# Patient Record
Sex: MALE | Race: WHITE | Employment: FULL TIME | URBAN - METROPOLITAN AREA
[De-identification: names, ages, dates, MRNs, and addresses within clinical notes are randomized per-mention and may not be internally consistent; named-entity substitution may affect disease eponyms.]

---

## 2017-10-30 ENCOUNTER — OFFICE VISIT (OUTPATIENT)
Dept: FAMILY MEDICINE | Facility: CLINIC | Age: 57
End: 2017-10-30
Payer: COMMERCIAL

## 2017-10-30 VITALS
BODY MASS INDEX: 27.2 KG/M2 | OXYGEN SATURATION: 97 % | TEMPERATURE: 97.6 F | DIASTOLIC BLOOD PRESSURE: 86 MMHG | WEIGHT: 190 LBS | HEIGHT: 70 IN | HEART RATE: 83 BPM | SYSTOLIC BLOOD PRESSURE: 132 MMHG

## 2017-10-30 DIAGNOSIS — K59.00 CONSTIPATION, UNSPECIFIED CONSTIPATION TYPE: ICD-10-CM

## 2017-10-30 DIAGNOSIS — M54.50 ACUTE LEFT-SIDED LOW BACK PAIN WITHOUT SCIATICA: Primary | ICD-10-CM

## 2017-10-30 LAB
BASOPHILS # BLD AUTO: 0 10E9/L (ref 0–0.2)
BASOPHILS NFR BLD AUTO: 0.1 %
DIFFERENTIAL METHOD BLD: NORMAL
EOSINOPHIL # BLD AUTO: 0.1 10E9/L (ref 0–0.7)
EOSINOPHIL NFR BLD AUTO: 1.8 %
ERYTHROCYTE [DISTWIDTH] IN BLOOD BY AUTOMATED COUNT: 12.1 % (ref 10–15)
ERYTHROCYTE [SEDIMENTATION RATE] IN BLOOD BY WESTERGREN METHOD: 8 MM/H (ref 0–20)
HCT VFR BLD AUTO: 43.8 % (ref 40–53)
HGB BLD-MCNC: 15 G/DL (ref 13.3–17.7)
LYMPHOCYTES # BLD AUTO: 1.7 10E9/L (ref 0.8–5.3)
LYMPHOCYTES NFR BLD AUTO: 24.6 %
MCH RBC QN AUTO: 32.3 PG (ref 26.5–33)
MCHC RBC AUTO-ENTMCNC: 34.2 G/DL (ref 31.5–36.5)
MCV RBC AUTO: 94 FL (ref 78–100)
MONOCYTES # BLD AUTO: 0.8 10E9/L (ref 0–1.3)
MONOCYTES NFR BLD AUTO: 12.2 %
NEUTROPHILS # BLD AUTO: 4.2 10E9/L (ref 1.6–8.3)
NEUTROPHILS NFR BLD AUTO: 61.3 %
PLATELET # BLD AUTO: 206 10E9/L (ref 150–450)
RBC # BLD AUTO: 4.65 10E12/L (ref 4.4–5.9)
WBC # BLD AUTO: 6.8 10E9/L (ref 4–11)

## 2017-10-30 PROCEDURE — 99203 OFFICE O/P NEW LOW 30 MIN: CPT | Performed by: PHYSICIAN ASSISTANT

## 2017-10-30 PROCEDURE — 86140 C-REACTIVE PROTEIN: CPT | Performed by: PHYSICIAN ASSISTANT

## 2017-10-30 PROCEDURE — 85652 RBC SED RATE AUTOMATED: CPT | Performed by: PHYSICIAN ASSISTANT

## 2017-10-30 PROCEDURE — 80053 COMPREHEN METABOLIC PANEL: CPT | Performed by: PHYSICIAN ASSISTANT

## 2017-10-30 PROCEDURE — 36415 COLL VENOUS BLD VENIPUNCTURE: CPT | Performed by: PHYSICIAN ASSISTANT

## 2017-10-30 PROCEDURE — 82550 ASSAY OF CK (CPK): CPT | Performed by: PHYSICIAN ASSISTANT

## 2017-10-30 PROCEDURE — 85025 COMPLETE CBC W/AUTO DIFF WBC: CPT | Performed by: PHYSICIAN ASSISTANT

## 2017-10-30 RX ORDER — NAPROXEN 500 MG/1
500 TABLET ORAL 2 TIMES DAILY PRN
Qty: 40 TABLET | Refills: 0 | Status: SHIPPED | OUTPATIENT
Start: 2017-10-30 | End: 2019-02-22

## 2017-10-30 RX ORDER — KETOROLAC TROMETHAMINE 30 MG/ML
30 INJECTION, SOLUTION INTRAMUSCULAR; INTRAVENOUS ONCE
Qty: 1 ML | Refills: 0 | OUTPATIENT
Start: 2017-10-30 | End: 2017-10-30

## 2017-10-30 RX ORDER — CYCLOBENZAPRINE HCL 10 MG
10 TABLET ORAL
COMMUNITY
Start: 2017-10-30 | End: 2019-02-22

## 2017-10-30 RX ORDER — CAPSAICIN 0.025 %
CREAM (GRAM) TOPICAL
COMMUNITY
Start: 2017-10-30 | End: 2019-02-22

## 2017-10-30 RX ORDER — ORPHENADRINE CITRATE 100 MG/1
100 TABLET, EXTENDED RELEASE ORAL 2 TIMES DAILY PRN
Qty: 40 TABLET | Refills: 0 | Status: SHIPPED | OUTPATIENT
Start: 2017-10-30 | End: 2019-02-22

## 2017-10-30 RX ORDER — ASPIRIN 81 MG
100 TABLET, DELAYED RELEASE (ENTERIC COATED) ORAL 2 TIMES DAILY
Qty: 60 TABLET | Refills: 1 | COMMUNITY
Start: 2017-10-30 | End: 2019-02-22

## 2017-10-30 NOTE — MR AVS SNAPSHOT
"              After Visit Summary   10/30/2017    Evan Young    MRN: [id number]           Patient Information     Date Of Birth          1960        Visit Information        Provider Department      10/30/2017 3:00 PM Lina Linn PA-C [facility]        Today's Diagnoses     Acute left-sided low back pain without sciatica    -  1    Constipation, unspecified constipation type           Follow-ups after your visit        Who to contact     If you have questions or need follow up information about today's clinic visit or your schedule please contact [facility] directly at [phone].  Normal or non-critical lab and imaging results will be communicated to you by Rolithhart, letter or phone within 4 business days after the clinic has received the results. If you do not hear from us within 7 days, please contact the clinic through Backpackt or phone. If you have a critical or abnormal lab result, we will notify you by phone as soon as possible.  Submit refill requests through [employer] or call your pharmacy and they will forward the refill request to us. Please allow 3 business days for your refill to be completed.          Additional Information About Your Visit        MyChart Information     [employer] lets you send messages to your doctor, view your test results, renew your prescriptions, schedule appointments and more. To sign up, go to [email or web address] . Click on \"Log in\" on the left side of the screen, which will take you to the Welcome page. Then click on \"Sign up Now\" on the right side of the page.     You will be asked to enter the access code listed below, as well as some personal information. Please follow the directions to create your username and password.     Your access code is: [email or web address]  Expires: 2018  2:56 PM     Your access code will  in 90 days. If you need help or a new code, please call your [facility] or [phone].        Care " "EveryWhere ID     This is your Care EveryWhere ID. This could be used by other organizations to access your [facility] medical records  [id number]        Your Vitals Were     Pulse Temperature Height Pulse Oximetry BMI (Body Mass Index)       83 97.6  F (36.4  C) (Tympanic) 5' 10\" (1.778 m) 97% 27.26 kg/m2        Blood Pressure from Last 3 Encounters:   10/30/17 132/86    Weight from Last 3 Encounters:   10/30/17 190 lb (86.2 kg)              Today, you had the following     No orders found for display         Today's Medication Changes          These changes are accurate as of: 10/30/17  3:38 PM.  If you have any questions, ask your nurse or doctor.               Start taking these medicines.        Dose/Directions    capsaicin 0.025 % Crea cream   Commonly known as:  ZOSTRIX   Used for:  Acute left-sided low back pain without sciatica   Started by:  Lina Linn PA-C        Use as directed   Refills:  0       docusate sodium 100 MG tablet   Commonly known as:  COLACE   Used for:  Constipation, unspecified constipation type   Started by:  Lina Linn PA-C        Dose:  100 mg   Take 100 mg by mouth 2 times daily   Quantity:  60 tablet   Refills:  1       ketorolac 30 MG/ML injection   Commonly known as:  TORADOL   Used for:  Acute left-sided low back pain without sciatica   Started by:  Lina Linn PA-C        Dose:  30 mg   Inject 1 mL (30 mg) into the muscle once for 1 dose   Quantity:  1 mL   Refills:  0       naproxen 500 MG tablet   Commonly known as:  NAPROSYN   Used for:  Acute left-sided low back pain without sciatica   Started by:  Lina Linn PA-C        Dose:  500 mg   Take 1 tablet (500 mg) by mouth 2 times daily as needed for moderate pain   Quantity:  40 tablet   Refills:  0       orphenadrine 100 MG 12 hr tablet   Commonly known as:  NORFLEX   Used for:  Acute left-sided low back pain without sciatica   Started by:  Lina Linn PA-C        Dose:  100 mg "   Take 1 tablet (100 mg) by mouth 2 times daily as needed for muscle spasms or moderate to severe pain   Quantity:  40 tablet   Refills:  0            Where to get your medicines      These medications were sent to [facility] - [place], [place] - [address]  [address], [facility] [address]     Phone:  [phone]     naproxen 500 MG tablet    orphenadrine 100 MG 12 hr tablet         Some of these will need a paper prescription and others can be bought over the counter.  Ask your nurse if you have questions.     You don't need a prescription for these medications     capsaicin 0.025 % Crea cream    docusate sodium 100 MG tablet    ketorolac 30 MG/ML injection                Primary Care Provider    None Specified       No primary provider on file.        Equal Access to Services     RAMON TALAMANTES : Noreen Jackson, wamartha rosas, qaybta kaalmada mery, diony olmos. So [facility] [phone].    ATENCIÓN: Si habla español, tiene a shah disposición servicios gratuitos de asistencia lingüística. [facility] [phone].    We comply with applicable federal civil rights laws and [place] laws. We do not discriminate on the basis of race, color, national origin, age, disability, sex, sexual orientation, or gender identity.            Thank you!     Thank you for choosing [facility]  for your care. Our goal is always to provide you with excellent care. Hearing back from our patients is one way we can continue to improve our services. Please take a few minutes to complete the written survey that you may receive in the mail after your visit with us. Thank you!             Your Updated Medication List - Protect others around you: Learn how to safely use, store and throw away your medicines at www.disposemymeds.org.          This list is accurate as of: 10/30/17  3:38 PM.  Always use your most recent med list.                    Brand Name Dispense Instructions for use Diagnosis    capsaicin 0.025 % Crea cream    ZOSTRIX     Use as directed    Acute left-sided low back pain without sciatica       cyclobenzaprine 10 MG tablet    FLEXERIL     Take 10 mg by mouth    Acute left-sided low back pain without sciatica       docusate sodium 100 MG tablet    COLACE    60 tablet    Take 100 mg by mouth 2 times daily    Constipation, unspecified constipation type       ketorolac 30 MG/ML injection    TORADOL    1 mL    Inject 1 mL (30 mg) into the muscle once for 1 dose    Acute left-sided low back pain without sciatica       naproxen 500 MG tablet    NAPROSYN    40 tablet    Take 1 tablet (500 mg) by mouth 2 times daily as needed for moderate pain    Acute left-sided low back pain without sciatica       orphenadrine 100 MG 12 hr tablet    NORFLEX    40 tablet    Take 1 tablet (100 mg) by mouth 2 times daily as needed for muscle spasms or moderate to severe pain    Acute left-sided low back pain without sciatica

## 2017-10-30 NOTE — NURSING NOTE
"Chief Complaint   Patient presents with     ER F/U     [facility] er f/u 10/29 for lower back pain        Initial /86  Pulse 83  Temp 97.6  F (36.4  C) (Tympanic)  Ht 5' 10\" (1.778 m)  Wt 190 lb (86.2 kg)  SpO2 97%  BMI 27.26 kg/m2 Estimated body mass index is 27.26 kg/(m^2) as calculated from the following:    Height as of this encounter: 5' 10\" (1.778 m).    Weight as of this encounter: 190 lb (86.2 kg).  Medication Reconciliation: complete   Dulce Hall, ONEIDA      "

## 2017-10-30 NOTE — PROGRESS NOTES
SUBJECTIVE:   Evan Young is a 57 year old male who presents to clinic today for the following health issues:    ED/UC Followup:    Facility: [facility] [place]  Date of visit: 10/30/17  Reason for visit: Back Pain   Current Status: Continued back pain with constipation     Patient seen in the early morning at [facility] on 10/30/17 for left flank pain. Per note from Svensen, the pain began yesterday morning with little to no relief from ibuprofen. The severity was rated as 6/10-8/10 at times with sharp pain during the most painful episodes. Patient stated he also had some nausea, but denied any vomiting or hematuria. On physical exam, tenderness was found over the left flank and lumbar area. He was given 2 percocet tablets while in the ER and prescribed 20 flexeril 10 mg tablets. A UA was completed and found to have no evidence of hematuria and was negative for nitrites and leukocyte esterase. He was then recommended to follow-up with his PCP for re-evaluation.     Per patient, Flexeril was last taken this morning around 7 AM and he has not taken any ibuprofen today. He states pain is now dull, but in same place as it had been previously this AM (in low back on the left side). He also states it will sometimes radiate along to the abdomen and feels like it is causing inflammation around his sciatic nerve. He denies any numbness or tingling in his extremities and cannot recall any recent trauma or overuse that may have precipitated this. The most comfortable positions for him are sitting up or standing and laying flat is the most uncomfortable. Patient notes he has had back pain in the past due to an injury, but that this feels different. He has been using heat on his back all day and thinks that may have helped the pain become more dull versus the sharp pain he was experiencing early this morning. Per patient, no imaging or other labs were drawn besides a UA and he has no history of  "kidney stones. Additionally, he notes that he has been constipated since yesterday and feels this may be contributing to his pain. His wife states he was drinking plenty of water throughout the day yesterday, but has not been drinking as much today.   States pain is       Problem list and histories reviewed & adjusted, as indicated.  Additional history: as documented    There is no problem list on file for this patient.    Past Surgical History:   Procedure Laterality Date     NO HISTORY OF SURGERY         Social History   Substance Use Topics     Smoking status: Never Smoker     Smokeless tobacco: Never Used     Alcohol use Not on file     No family history on file.      Current Outpatient Prescriptions   Medication Sig Dispense Refill     cyclobenzaprine (FLEXERIL) 10 MG tablet Take 10 mg by mouth       ketorolac (TORADOL) 30 MG/ML injection Inject 1 mL (30 mg) into the muscle once for 1 dose 1 mL 0     orphenadrine (NORFLEX) 100 MG 12 hr tablet Take 1 tablet (100 mg) by mouth 2 times daily as needed for muscle spasms or moderate to severe pain 40 tablet 0     naproxen (NAPROSYN) 500 MG tablet Take 1 tablet (500 mg) by mouth 2 times daily as needed for moderate pain 40 tablet 0     docusate sodium (COLACE) 100 MG tablet Take 100 mg by mouth 2 times daily 60 tablet 1     capsaicin (ZOSTRIX) 0.025 % CREA cream Use as directed       No Known Allergies      Reviewed and updated as needed this visit by clinical staff  Tobacco  Allergies  Meds  Med Hx  Soc Hx      Reviewed and updated as needed this visit by Provider  Med Hx         ROS:  Constitutional, HEENT, cardiovascular, pulmonary, GI, , musculoskeletal, neuro, skin, endocrine and psych systems are negative, except as otherwise noted.    OBJECTIVE:   /86  Pulse 83  Temp 97.6  F (36.4  C) (Tympanic)  Ht 5' 10\" (1.778 m)  Wt 190 lb (86.2 kg)  SpO2 97%  BMI 27.26 kg/m2  Body mass index is 27.26 kg/(m^2).  GENERAL: healthy, alert and no " distress  ABDOMEN: tenderness to palpation over left lower quadrant, mild tenderness over right lower quadrant; soft, otherwise nontender, no hepatosplenomegaly, no masses and bowel sounds normal  MS: no gross musculoskeletal defects noted, no edema; no SI joint pain; non-tenderness to sciatic notch bilaterally, perilumbar musculature non-tender bilaterally; sebaceous cyst over low-mid back left side of spinous process; pain with left rotation, left lateral bend and right lateral bend as well as with flexion and extension  PSYCH: mentation appears normal, affect normal/bright   Diagnostic Test Results:  Results for orders placed or performed in visit on 10/30/17   CBC with platelets and differential   Result Value Ref Range    WBC 6.8 4.0 - 11.0 10e9/L    RBC Count 4.65 4.4 - 5.9 10e12/L    Hemoglobin 15.0 13.3 - 17.7 g/dL    Hematocrit 43.8 40.0 - 53.0 %    MCV 94 78 - 100 fl    MCH 32.3 26.5 - 33.0 pg    MCHC 34.2 31.5 - 36.5 g/dL    RDW 12.1 10.0 - 15.0 %    Platelet Count 206 150 - 450 10e9/L    Diff Method Automated Method     % Neutrophils 61.3 %    % Lymphocytes 24.6 %    % Monocytes 12.2 %    % Eosinophils 1.8 %    % Basophils 0.1 %    Absolute Neutrophil 4.2 1.6 - 8.3 10e9/L    Absolute Lymphocytes 1.7 0.8 - 5.3 10e9/L    Absolute Monocytes 0.8 0.0 - 1.3 10e9/L    Absolute Eosinophils 0.1 0.0 - 0.7 10e9/L    Absolute Basophils 0.0 0.0 - 0.2 10e9/L   ESR: Erythrocyte sedimentation rate   Result Value Ref Range    Sed Rate 8 0 - 20 mm/h        UA - Urinalysis with Reflex Micro (10/30/2017 12:21 AM)  UA - Urinalysis with Reflex Micro (10/30/2017 12:21 AM)   Component Value Ref Range   COLOR  Yellow Yellow Color   CLARITY  Clear Clear Clarity   SPECIFIC GRAVITY,URINE  1.020 1.010, 1.015, 1.020, 1.025    PH,URINE  6.0 6.0, 7.0, 8.0, 5.5, 6.5, 7.5, 8.5    UROBILINOGEN,QUALITATIVE  Normal Normal EU/dl   PROTEIN, URINE Negative Negative mg/dL   GLUCOSE, URINE Negative Negative mg/dL   KETONES,URINE  Negative  Negative mg/dL   BILIRUBIN,URINE  Negative Negative    OCCULT BLOOD,URINE  Negative Negative    NITRITE  Negative Negative    LEUKOCYTE ESTERASE  Negative Negative          ASSESSMENT/PLAN:   Evan was seen today for er f/u.    Diagnoses and all orders for this visit:    Acute left-sided low back pain without sciatica; Constipation, unspecified constipation type  Patient given toradol injection while in clinic in management of his continued back pain. Additionally, patient's flexeril discontinued and changed to norflex as patient stated flexeril did very little in helping his pain. Also recommended patient take naproxen BID as well as use topical capsaicin cream to help with pain relief. Patient's back and abdominal pain also likely related to constipation. Recommended patient stay well hydrated, increase his fiber intake as well as start taking docusate. Given patient's LLQ pain and no recent colonoscopy, labs ordered to ensure patient does not have an underlying diverticulitis or other infection. Last CK on 4/24/12 was 572 and was repeated today. Patient states he is not currently taking Crestor in management of his hyperlipidemia and likely attributed to his previously high CK. Recommended patient begin light exercises as tolerated to help in improving his range of motion. Additionally, recommended patient have sebaceous cyst removed if it should start to cause him discomfort. Patient to follow-up if symptoms do not improve or worsen in the next few days. Will reassess need for imaging if patient's symptoms do not improve.   -     ketorolac (TORADOL) 30 MG/ML injection; Inject 1 mL (30 mg) into the muscle once for 1 dose  -     orphenadrine (NORFLEX) 100 MG 12 hr tablet; Take 1 tablet (100 mg) by mouth 2 times daily as needed for muscle spasms or moderate to severe pain  -     naproxen (NAPROSYN) 500 MG tablet; Take 1 tablet (500 mg) by mouth 2 times daily as needed for moderate pain  -     capsaicin (ZOSTRIX)  0.025 % CREA cream; Use as directed  -     CBC with platelets and differential  -     Comprehensive metabolic panel  -     CK total  -     ESR: Erythrocyte sedimentation rate  -     CRP, inflammation  -     docusate sodium (COLACE) 100 MG tablet; Take 100 mg by mouth 2 times daily    Lina Linn PA-C  [facility]

## 2017-10-31 LAB
ALBUMIN SERPL-MCNC: 4 G/DL (ref 3.4–5)
ALP SERPL-CCNC: 53 U/L (ref 40–150)
ALT SERPL W P-5'-P-CCNC: 61 U/L (ref 0–70)
ANION GAP SERPL CALCULATED.3IONS-SCNC: 11 MMOL/L (ref 3–14)
AST SERPL W P-5'-P-CCNC: 36 U/L (ref 0–45)
BILIRUB SERPL-MCNC: 0.6 MG/DL (ref 0.2–1.3)
BUN SERPL-MCNC: 11 MG/DL (ref 7–30)
CALCIUM SERPL-MCNC: 9.4 MG/DL (ref 8.5–10.1)
CHLORIDE SERPL-SCNC: 104 MMOL/L (ref 94–109)
CK SERPL-CCNC: 239 U/L (ref 30–300)
CO2 SERPL-SCNC: 24 MMOL/L (ref 20–32)
CREAT SERPL-MCNC: 1.06 MG/DL (ref 0.66–1.25)
CRP SERPL-MCNC: <2.9 MG/L (ref 0–8)
GFR SERPL CREATININE-BSD FRML MDRD: 72 ML/MIN/1.7M2
GLUCOSE SERPL-MCNC: 90 MG/DL (ref 70–99)
POTASSIUM SERPL-SCNC: 4.4 MMOL/L (ref 3.4–5.3)
PROT SERPL-MCNC: 7.6 G/DL (ref 6.8–8.8)
SODIUM SERPL-SCNC: 139 MMOL/L (ref 133–144)

## 2017-11-01 NOTE — PROGRESS NOTES
Evan  I have reviewed your recent labs. Here are the results:    -Liver and gallbladder tests are normal. (ALT,AST, Alk phos, bilirubin), kidney function is normal (Cr, GFR), Sodium is normal, Potassium is normal, Calcium is normal, Glucose is normal (diabetes screening test).   -Normal red blood cell (hgb) levels, normal white blood cell count and normal platelet levels.  -Inflammatory markers are normal (CRP, ESR)  -Muscle enzyme marker is normal (CK)    For additional lab test information, labtestsonline.org is an excellent reference.      If you have any questions please do not hesitate to contact our office via phone ([phone]) or MyChart.    Lina Linn, MS, PA-C  [facility]

## 2019-02-19 ENCOUNTER — TELEPHONE (OUTPATIENT)
Dept: FAMILY MEDICINE | Facility: CLINIC | Age: 59
End: 2019-02-19

## 2019-02-19 NOTE — TELEPHONE ENCOUNTER
"  Wife my charted information below       ____________________________________________________________________________________________    Megan,    His full name is Evan Young, and YOB: 1960.  Will you answer the question about the appointment on Friday in his account? or will you let me know?    Thank you so much,  Audra  ----- Message -----  From: Office of Letitia Peña MD  Sent: 2/18/2019  6:33 PM CST  To: Audra Brayden Young  Subject: RE: Question about an upcoming visit  What is his full name and date of birth ? My chart becomes a permeant part of your medical chart. I will have to transfer this information to his chart.     Thank you for your time     Megan  RN, BSN  [facility]        ----- Message -----     From: Audra Young     Sent: 2/18/2019  3:52 PM CST       To: Letitia Peña MD  Subject: Question about an upcoming visit    [facility]    I am writing about my , Evan Young,  who, although you have not yet seen, would like to have you as his doctor.  He has called the clinic and made an appointment for your first available time--April 4th at 11:30 am.  However, he is in pain and will need to see either you or someone in your team sooner.  He is currently traveling for work but will be back in town Thursday night. I am wondering if we can make an appointment for Friday.    He has what we believe is a lipoma in his back, 4 or 5\" above the lower back area. Last week, it became red and the area was warmer than the surrounding areas.  For about 2 days we applied Cortizone 1mg cream => no improvement.  Friday, we tried neosporin, it improved a little at first. But the area became very sore.  We applied warm compresses as well.     Unfortunately,  he had to travel for work from today returning Thursday night.  He is under a lot of pain and has asked me to try to set up an appointment sooner than the scheduled for April " 4th.    Would you be so kind as to let us know the best way to proceed?    Thank you so much,  Audra  __________________________________________________________________________________________________

## 2019-02-19 NOTE — TELEPHONE ENCOUNTER
Called #   Telephone Information:   Mobile [phone]         Rash  cyst in back on the left lower side - that is painful       Duration: several years         Specific cause: has a cyst that is a size of a nickel usually but the last week has grown in size several inches wide and long     Description:   Location of pain: low back left  Character of pain: stabbing and burning  Pain radiation:moves towards the left side     Character: round  Itching (Pruritis): no   White discharge - from the site,   It is red and warm to the touch     Progression of Symptoms:  worsening    Accompanying Signs & Symptoms:  Fever: no   Body aches or joint pain: no   Sore throat symptoms: no   Recent cold symptoms: no     History:   Previous similar rash: YES- this has been ongoing for years just started worsen over the last wek     Precipitating factors:   Exposure to similar rash: no   New exposures: None   Recent travel: no     Alleviating factors:  Warm compress and antibiotic ointment - has not helped     Therapies Tried and outcome: see above     Pt stated he will be in town  Friday morning and would like to be seen then - made an OV for Friday     Rn advised pt to watch out for worsening symptoms ( reviewed worsening symptoms with pt)  He will need to go to the UC near him     Patient stated an understanding and agreed with plan.    Megan Swift RN, BSN  [facility]

## 2019-02-22 ENCOUNTER — OFFICE VISIT (OUTPATIENT)
Dept: FAMILY MEDICINE | Facility: CLINIC | Age: 59
End: 2019-02-22
Payer: COMMERCIAL

## 2019-02-22 VITALS
BODY MASS INDEX: 27.63 KG/M2 | TEMPERATURE: 98.1 F | WEIGHT: 193 LBS | HEART RATE: 87 BPM | DIASTOLIC BLOOD PRESSURE: 80 MMHG | OXYGEN SATURATION: 98 % | HEIGHT: 70 IN | SYSTOLIC BLOOD PRESSURE: 136 MMHG

## 2019-02-22 DIAGNOSIS — L72.3 INFECTED SEBACEOUS CYST: Primary | ICD-10-CM

## 2019-02-22 DIAGNOSIS — L08.9 INFECTED SEBACEOUS CYST: Primary | ICD-10-CM

## 2019-02-22 DIAGNOSIS — Z12.11 SCREEN FOR COLON CANCER: ICD-10-CM

## 2019-02-22 LAB
GRAM STN SPEC: ABNORMAL
GRAM STN SPEC: ABNORMAL
Lab: ABNORMAL
SPECIMEN SOURCE: ABNORMAL

## 2019-02-22 PROCEDURE — 87070 CULTURE OTHR SPECIMN AEROBIC: CPT | Performed by: FAMILY MEDICINE

## 2019-02-22 PROCEDURE — 99213 OFFICE O/P EST LOW 20 MIN: CPT | Mod: 25 | Performed by: FAMILY MEDICINE

## 2019-02-22 PROCEDURE — 87205 SMEAR GRAM STAIN: CPT | Performed by: FAMILY MEDICINE

## 2019-02-22 PROCEDURE — 11403 EXC TR-EXT B9+MARG 2.1-3CM: CPT | Performed by: FAMILY MEDICINE

## 2019-02-22 RX ORDER — CEPHALEXIN 500 MG/1
500 CAPSULE ORAL 4 TIMES DAILY
Qty: 21 CAPSULE | Refills: 0 | Status: SHIPPED | OUTPATIENT
Start: 2019-02-22 | End: 2019-03-01

## 2019-02-22 ASSESSMENT — MIFFLIN-ST. JEOR: SCORE: 1691.69

## 2019-02-22 NOTE — PROGRESS NOTES
"  SUBJECTIVE:                                                      Evan Young is a 59 year old male who presents to clinic today for the following health issues:    Triaged 02/21/2019 1 day ago    Rash  cyst in back on the left lower side - that is painful        Duration: several years         Specific cause: has a cyst that is a size of a nickel usually but the last week has grown in size several inches wide and long     Description:   Location of pain: low back left  Character of pain: stabbing and burning  Pain radiation:moves towards the left side      Character: round  Itching (Pruritis): no   White discharge - from the site,   It is red and warm to the touch     Progression of Symptoms:  worsening    Accompanying Signs & Symptoms:  Fever: no   Body aches or joint pain: no   Sore throat symptoms: no   Recent cold symptoms: no     History:   Previous similar rash: YES- this has been ongoing for years just started worsen over the last wek     Precipitating factors:   Exposure to similar rash: no   New exposures: None   Recent travel: no     Alleviating factors:  Warm compress and antibiotic ointment - has not helped      Therapies Tried and outcome: see above      Pt stated he will be in town  Friday morning and would like to be seen then - made an OV for Friday      Rn advised pt to watch out for worsening symptoms ( reviewed worsening symptoms with pt)  He will need to go to the  near him        Problem list and histories reviewed & adjusted, as indicated.  Additional history: as documented    ROS:  Constitutional, HEENT, cardiovascular, pulmonary, GI, , musculoskeletal, neuro, skin, endocrine and psych systems are negative, except as otherwise noted.    OBJECTIVE:                                                      /80   Pulse 87   Temp 98.1  F (36.7  C) (Oral)   Ht 1.778 m (5' 10\")   Wt 87.5 kg (193 lb)   SpO2 98%   BMI 27.69 kg/m   Body mass index is 27.69 kg/m .   GENERAL: healthy, " alert, well nourished, well hydrated, no distress  NECK: no tenderness, no adenopathy, no asymmetry, no masses, no stiffness; thyroid- normal to palpation  RESP: lungs clear to auscultation - no rales, no rhonchi, no wheezes  CV: regular rates and rhythm, normal S1 S2, no S3 or S4 and no murmur, no click or rub -  ABDOMEN: soft, no tenderness, no  hepatosplenomegaly, no masses, normal bowel sounds  SKIN: large 2x2 cm red cyst with purulent drainage on the lumbar spine midline otherwise no suspicious lesions, no rashes  NEURO: strength and tone- normal, sensory exam- grossly normal, mentation- intact, speech- normal, reflexes- symmetric  BACK: no CVA tenderness, no paralumbar tenderness    Diagnostic test results:  Pending    ASSESSMENT/PLAN:                                                      Evan was seen today for derm problem.    Diagnoses and all orders for this visit:    Infected sebaceous cyst  -     cephALEXin (KEFLEX) 500 MG capsule; Take 1 capsule (500 mg) by mouth 4 times daily for 7 days  -     Wound Culture Aerobic Bacterial; Future  -     Gram stain; Future  -     Gram stain  -     Wound Culture Aerobic Bacterial    After informed consent was obtained, using Hibiclens for cleansing and 1% Lidocaine with epinephrine for anesthetic, with sterile technique, incision and drainage of abscess and cyst removal was performed. Packed with gauze - to be removed in ~36 hours.  Antibiotic dressing is applied, and wound care instructions provided.  Be alert for any signs of cutaneous infection. The procedure was well tolerated without complications. Follow up: The patient may return prn..      Screen for colon cancer  -     GASTROENTEROLOGY ADULT REF PROCEDURE ONLY Yohana Winn [phone]; [facility]        Risks, benefits and alternatives of treatments discussed. Plan agreed on.      Followup: Return in about 1 week (around 3/1/2019), or if symptoms worsen or fail to improve.    See patient  instructions.           Omega Licona MD   Pager: [phone]

## 2019-02-26 LAB
BACTERIA SPEC CULT: NO GROWTH
Lab: NORMAL
SPECIMEN SOURCE: NORMAL

## 2019-02-26 NOTE — RESULT ENCOUNTER NOTE
Dear Evan,    Here is a summary of your recent test results:  No predominate bacteria were noted    How is your back doing?    For additional lab test information, [email or web address] is an excellent reference.    In addition, here is a list of due or overdue Health Maintenance reminders:  Preventive Care Visit due on 1960  Depression Assessment 2 - yearly due on 01/08/1971  HIV SCREEN (SYSTEM ASSIGNED) due on 01/08/1977  Hepatitis C Screening due on 01/08/1977  Cholesterol Lab - every 5 years due on 01/08/1994  Colon Cancer Screening - every 10 years. due on 01/08/2009  Zoster (Shingles) Vaccine(1 of 2) due on 01/08/2009  Discuss Advance Directive Planning due on 01/08/2014  Flu Vaccine(1) due on 09/01/2018    Please call us at [phone] (or use [employer]) to address the above recommendations if needed.           Thank you very much for trusting me and [facility] [place].     Healthy regards,  Omega Licona MD

## 2020-03-11 ENCOUNTER — HEALTH MAINTENANCE LETTER (OUTPATIENT)
Age: 60
End: 2020-03-11

## 2020-12-27 ENCOUNTER — HEALTH MAINTENANCE LETTER (OUTPATIENT)
Age: 60
End: 2020-12-27

## 2021-02-12 NOTE — PROGRESS NOTES
Assessment & Plan   Cyst of skin  Cyst on the lower back, superior to previously removed cyst. Removed today, dissolvable sutures used with derma bond.     After informed consent was obtained, using Hibiclens for cleansing and 1% Lidocaine with epinephrine for anesthetic, with sterile technique, cyst removal was performed. Antibiotic dressing is applied, and wound care instructions provided.  Be alert for any signs of cutaneous infection. The procedure was well tolerated without complications. .    Return in about 1 month (around 3/15/2021) for Routine preventive in person with me.    Gume Soto, MS3 Student,  has participated in the care of this patient.     Provider Disclosure:  I agree with above History, Review of Systems, Physical exam and Plan.  I have reviewed the content of the documentation and have edited it as needed. I have personally performed the services documented here and the documentation accurately represents those services and the decisions I have made.      Electronically signed by:          Omega Licona M.D.       [address] [address]  [email or web address]   Office: [phone]       Brandon Gordon is a 61 year old who presents for the following health issues     HPI     Patient presents with concern for a cyst on his lower back near the site of a previously excised cyst. The previous cyst was removed in 2/2019 and has not grown back, but there is a noticeable lump just superior to the previous site. He has no pain and it does not itch. It has slowly grown in size in the past 6 months, but has never produced any discharge, come to a head, or popped. When he had the previous cyst, it got relatively infected prior to being removed and he was told that if he were to have another cyst that he should come in earlier to prevent infection.    No fevers, chills, nausea/vomiting, signs of systemic illness.    Review of Systems  "  Constitutional, HEENT, cardiovascular, pulmonary, gi and gu systems are negative, except as otherwise noted.      Objective    /80   Pulse 75   Temp 95.6  F (35.3  C) (Tympanic)   Ht 1.778 m (5' 10\")   Wt 88.9 kg (196 lb)   SpO2 99%   BMI 28.12 kg/m    Body mass index is 28.12 kg/m .  Physical Exam   GENERAL: healthy, alert and no distress  EYES: Eyes grossly normal to inspection, PERRL and conjunctivae and sclerae normal  RESP: lungs clear to auscultation - no rales, rhonchi or wheezes  CV: regular rate and rhythm, normal S1 S2, no S3 or S4, no murmur, click or rub, no peripheral edema and peripheral pulses strong  ABDOMEN: soft, nontender, no hepatosplenomegaly, no masses and bowel sounds normal  MS: no gross musculoskeletal defects noted, no edema  SKIN: cystic type growth superior a previously excised cyst site. Hard to palpation with size roughly 1.2 cm in diameter. Not erythematous. Blanched compared to surrounding skin. Otherwise, no suspicious lesions or rashes  NEURO: Normal strength and tone, mentation intact and speech normal  PSYCH: mentation appears normal, affect normal/bright    No diagnostic tests done during this visit.          "

## 2021-02-15 ENCOUNTER — OFFICE VISIT (OUTPATIENT)
Dept: FAMILY MEDICINE | Facility: CLINIC | Age: 61
End: 2021-02-15
Payer: COMMERCIAL

## 2021-02-15 VITALS
DIASTOLIC BLOOD PRESSURE: 80 MMHG | OXYGEN SATURATION: 99 % | BODY MASS INDEX: 28.06 KG/M2 | SYSTOLIC BLOOD PRESSURE: 136 MMHG | TEMPERATURE: 95.6 F | HEART RATE: 75 BPM | WEIGHT: 196 LBS | HEIGHT: 70 IN

## 2021-02-15 DIAGNOSIS — L72.9 CYST OF SKIN: Primary | ICD-10-CM

## 2021-02-15 PROCEDURE — 11402 EXC TR-EXT B9+MARG 1.1-2 CM: CPT | Performed by: FAMILY MEDICINE

## 2021-02-15 SDOH — ECONOMIC STABILITY: FOOD INSECURITY: WITHIN THE PAST 12 MONTHS, YOU WORRIED THAT YOUR FOOD WOULD RUN OUT BEFORE YOU GOT MONEY TO BUY MORE.: NOT ASKED

## 2021-02-15 SDOH — ECONOMIC STABILITY: FOOD INSECURITY: WITHIN THE PAST 12 MONTHS, THE FOOD YOU BOUGHT JUST DIDN'T LAST AND YOU DIDN'T HAVE MONEY TO GET MORE.: NOT ASKED

## 2021-02-15 SDOH — ECONOMIC STABILITY: INCOME INSECURITY: HOW HARD IS IT FOR YOU TO PAY FOR THE VERY BASICS LIKE FOOD, HOUSING, MEDICAL CARE, AND HEATING?: NOT ASKED

## 2021-02-15 SDOH — ECONOMIC STABILITY: TRANSPORTATION INSECURITY
IN THE PAST 12 MONTHS, HAS LACK OF TRANSPORTATION KEPT YOU FROM MEETINGS, WORK, OR FROM GETTING THINGS NEEDED FOR DAILY LIVING?: NOT ASKED

## 2021-02-15 SDOH — ECONOMIC STABILITY: TRANSPORTATION INSECURITY
IN THE PAST 12 MONTHS, HAS THE LACK OF TRANSPORTATION KEPT YOU FROM MEDICAL APPOINTMENTS OR FROM GETTING MEDICATIONS?: NOT ASKED

## 2021-02-15 ASSESSMENT — MIFFLIN-ST. JEOR: SCORE: 1695.3

## 2021-03-19 NOTE — PROGRESS NOTES
"SUBJECTIVE:   CC: Evan Young is an 61 year old male who presents for preventive health visit.     Healthy Habits:     Getting at least 3 servings of Calcium per day:  Yes    Bi-annual eye exam:  Yes    Dental care twice a year:  Yes    Sleep apnea or symptoms of sleep apnea:  None    Diet:  Regular (no restrictions)    Duration of exercise:  15-30 minutes    Taking medications regularly:  No    Barriers to taking medications:  Other    Medication side effects:  Not applicable    PHQ-2 Total Score: 0    Additional concerns today:  No        Today's PHQ-2 Score:   PHQ-2 ( 1999 [employer]) 3/22/2021 3/22/2021   Q1: Little interest or pleasure in doing things 0 0   Q2: Feeling down, depressed or hopeless 0 0   PHQ-2 Score 0 0   Q1: Little interest or pleasure in doing things - -   Q2: Feeling down, depressed or hopeless - -   PHQ-2 Score - -       Abuse: Current or Past(Physical, Sexual or Emotional)- No  Do you feel safe in your environment? Yes        Social History     Tobacco Use     Smoking status: Never Smoker     Smokeless tobacco: Never Used   Substance Use Topics     Alcohol use: Yes     Comment: 0-3/day     If you drink alcohol do you typically have >3 drinks per day or >7 drinks per week? No                      Reviewed orders with patient. Reviewed health maintenance and updated orders accordingly - Yes  Reviewed and updated as needed this visit by clinical staff  Tobacco  Allergies  Meds  Problems  Med Hx  Surg Hx  Fam Hx  Soc Hx          Reviewed and updated as needed this visit by Provider  Tobacco  Allergies  Meds  Problems  Med Hx  Surg Hx  Fam Hx           ROS:  Constitutional, HEENT, cardiovascular, pulmonary, GI, , musculoskeletal, neuro, skin, endocrine and psych systems are negative, except as otherwise noted.  OBJECTIVE:   /86   Pulse 74   Temp 96.2  F (35.7  C) (Tympanic)   Resp 14   Ht 1.778 m (5' 10\")   Wt 88.5 kg (195 lb)   SpO2 98%   BMI 27.98 kg/m  "   EXAM:  GENERAL: healthy, alert and no distress  EYES: Eyes grossly normal to inspection, PERRL and conjunctivae and sclerae normal  HENT: ear canals and TM's normal, nose and mouth without ulcers or lesions  NECK: no adenopathy, no asymmetry, masses, or scars and thyroid normal to palpation  RESP: lungs clear to auscultation - no rales, rhonchi or wheezes  BREAST: normal without masses, tenderness or nipple discharge and no palpable axillary masses or adenopathy  CV: regular rate and rhythm, normal S1 S2, no S3 or S4, no murmur, click or rub, no peripheral edema and peripheral pulses strong  ABDOMEN: soft, nontender, no hepatosplenomegaly, no masses and bowel sounds normal   (male): normal male genitalia without lesions or urethral discharge, no hernia  MS: no gross musculoskeletal defects noted, no edema  SKIN: no suspicious lesions or rashes  NEURO: Normal strength and tone, mentation intact and speech normal  PSYCH: mentation appears normal, affect normal/bright  LYMPH: no cervical, supraclavicular, axillary, or inguinal adenopathy  RECTAL: normal sphincter tone, no rectal masses, prostate normal size, smooth, nontender without nodules or masses    ASSESSMENT/PLAN:   Routine general medical examination at a health care facility      Hyperlipidemia LDL goal <100  Elevated in past and has taken atorvastatin as well as Crestor.  In review of his Care Everywhere did show an elevated CK of unclear cause.  We will recheck levels and check he has ASCVD risk score and recommend treatment accordingly  - Lipid panel reflex to direct LDL Fasting  - CK total    Vitamin D deficiency  Prior history and will check levels, supplement recommended if needed.  - Vitamin D Deficiency    Screening for prostate cancer  - PROSTATE SPEC ANTIGEN SCREEN    Screening for diabetes mellitus  - Comprehensive metabolic panel    Screening, deficiency anemia, iron  - CBC with platelets    Screen for colon cancer  - GASTROENTEROLOGY ADULT REF  "PROCEDURE ONLY      COUNSELING:  Reviewed preventive health counseling, as reflected in patient instructions    Estimated body mass index is 27.98 kg/m  as calculated from the following:    Height as of this encounter: 1.778 m (5' 10\").    Weight as of this encounter: 88.5 kg (195 lb).  Weight management plan: Discussed healthy diet and exercise guidelines     reports that he has never smoked. He has never used smokeless tobacco.      Return in about 1 year (around 3/22/2022) for wellness exam with fasting labs, in person, with Dr Omega Licona.           Omega Licona MD     [address] [address]  [email or web address]     Office: [phone]     "

## 2021-03-22 ENCOUNTER — OFFICE VISIT (OUTPATIENT)
Dept: FAMILY MEDICINE | Facility: CLINIC | Age: 61
End: 2021-03-22
Payer: COMMERCIAL

## 2021-03-22 VITALS
HEIGHT: 70 IN | TEMPERATURE: 96.2 F | BODY MASS INDEX: 27.92 KG/M2 | WEIGHT: 195 LBS | HEART RATE: 74 BPM | SYSTOLIC BLOOD PRESSURE: 120 MMHG | RESPIRATION RATE: 14 BRPM | OXYGEN SATURATION: 98 % | DIASTOLIC BLOOD PRESSURE: 86 MMHG

## 2021-03-22 DIAGNOSIS — Z12.5 SCREENING FOR PROSTATE CANCER: ICD-10-CM

## 2021-03-22 DIAGNOSIS — E55.9 VITAMIN D DEFICIENCY: ICD-10-CM

## 2021-03-22 DIAGNOSIS — Z13.1 SCREENING FOR DIABETES MELLITUS: ICD-10-CM

## 2021-03-22 DIAGNOSIS — E78.5 HYPERLIPIDEMIA LDL GOAL <100: ICD-10-CM

## 2021-03-22 DIAGNOSIS — Z13.0 SCREENING, DEFICIENCY ANEMIA, IRON: ICD-10-CM

## 2021-03-22 DIAGNOSIS — Z12.11 SCREEN FOR COLON CANCER: ICD-10-CM

## 2021-03-22 DIAGNOSIS — Z00.00 ROUTINE GENERAL MEDICAL EXAMINATION AT A HEALTH CARE FACILITY: Primary | ICD-10-CM

## 2021-03-22 PROBLEM — L72.9 CYST OF SKIN: Status: RESOLVED | Noted: 2021-02-15 | Resolved: 2021-03-22

## 2021-03-22 LAB
ALBUMIN SERPL-MCNC: 3.8 G/DL (ref 3.4–5)
ALP SERPL-CCNC: 56 U/L (ref 40–150)
ALT SERPL W P-5'-P-CCNC: 47 U/L (ref 0–70)
ANION GAP SERPL CALCULATED.3IONS-SCNC: 5 MMOL/L (ref 3–14)
AST SERPL W P-5'-P-CCNC: 30 U/L (ref 0–45)
BILIRUB SERPL-MCNC: 0.6 MG/DL (ref 0.2–1.3)
BUN SERPL-MCNC: 15 MG/DL (ref 7–30)
CALCIUM SERPL-MCNC: 9.2 MG/DL (ref 8.5–10.1)
CHLORIDE SERPL-SCNC: 103 MMOL/L (ref 94–109)
CHOLEST SERPL-MCNC: 387 MG/DL
CK SERPL-CCNC: 364 U/L (ref 30–300)
CO2 SERPL-SCNC: 29 MMOL/L (ref 20–32)
CREAT SERPL-MCNC: 1.03 MG/DL (ref 0.66–1.25)
DEPRECATED CALCIDIOL+CALCIFEROL SERPL-MC: 16 UG/L (ref 20–75)
ERYTHROCYTE [DISTWIDTH] IN BLOOD BY AUTOMATED COUNT: 12 % (ref 10–15)
GFR SERPL CREATININE-BSD FRML MDRD: 77 ML/MIN/{1.73_M2}
GLUCOSE SERPL-MCNC: 95 MG/DL (ref 70–99)
HCT VFR BLD AUTO: 43.8 % (ref 40–53)
HDLC SERPL-MCNC: 49 MG/DL
HGB BLD-MCNC: 14.5 G/DL (ref 13.3–17.7)
LDLC SERPL CALC-MCNC: 288 MG/DL
MCH RBC QN AUTO: 31.1 PG (ref 26.5–33)
MCHC RBC AUTO-ENTMCNC: 33.1 G/DL (ref 31.5–36.5)
MCV RBC AUTO: 94 FL (ref 78–100)
NONHDLC SERPL-MCNC: 338 MG/DL
PLATELET # BLD AUTO: 193 10E9/L (ref 150–450)
POTASSIUM SERPL-SCNC: 4.2 MMOL/L (ref 3.4–5.3)
PROT SERPL-MCNC: 7.4 G/DL (ref 6.8–8.8)
PSA SERPL-ACNC: 2.04 UG/L (ref 0–4)
RBC # BLD AUTO: 4.66 10E12/L (ref 4.4–5.9)
SODIUM SERPL-SCNC: 137 MMOL/L (ref 133–144)
TRIGL SERPL-MCNC: 249 MG/DL
WBC # BLD AUTO: 4.6 10E9/L (ref 4–11)

## 2021-03-22 PROCEDURE — 99396 PREV VISIT EST AGE 40-64: CPT | Mod: 25 | Performed by: FAMILY MEDICINE

## 2021-03-22 PROCEDURE — 90714 TD VACC NO PRESV 7 YRS+ IM: CPT | Performed by: FAMILY MEDICINE

## 2021-03-22 PROCEDURE — 80053 COMPREHEN METABOLIC PANEL: CPT | Performed by: FAMILY MEDICINE

## 2021-03-22 PROCEDURE — 82550 ASSAY OF CK (CPK): CPT | Performed by: FAMILY MEDICINE

## 2021-03-22 PROCEDURE — 90471 IMMUNIZATION ADMIN: CPT | Performed by: FAMILY MEDICINE

## 2021-03-22 PROCEDURE — G0103 PSA SCREENING: HCPCS | Performed by: FAMILY MEDICINE

## 2021-03-22 PROCEDURE — 80061 LIPID PANEL: CPT | Performed by: FAMILY MEDICINE

## 2021-03-22 PROCEDURE — 36415 COLL VENOUS BLD VENIPUNCTURE: CPT | Performed by: FAMILY MEDICINE

## 2021-03-22 PROCEDURE — 82306 VITAMIN D 25 HYDROXY: CPT | Performed by: FAMILY MEDICINE

## 2021-03-22 PROCEDURE — 85027 COMPLETE CBC AUTOMATED: CPT | Performed by: FAMILY MEDICINE

## 2021-03-22 RX ORDER — ROSUVASTATIN CALCIUM 20 MG/1
20 TABLET, COATED ORAL DAILY
Qty: 90 TABLET | Refills: 3 | Status: SHIPPED | OUTPATIENT
Start: 2021-03-22

## 2021-03-22 SDOH — HEALTH STABILITY: MENTAL HEALTH: HOW OFTEN DO YOU HAVE A DRINK CONTAINING ALCOHOL?: NOT ASKED

## 2021-03-22 SDOH — HEALTH STABILITY: MENTAL HEALTH: HOW OFTEN DO YOU HAVE 6 OR MORE DRINKS ON ONE OCCASION?: NOT ASKED

## 2021-03-22 SDOH — HEALTH STABILITY: MENTAL HEALTH: HOW MANY STANDARD DRINKS CONTAINING ALCOHOL DO YOU HAVE ON A TYPICAL DAY?: NOT ASKED

## 2021-03-22 ASSESSMENT — MIFFLIN-ST. JEOR: SCORE: 1690.76

## 2021-03-22 ASSESSMENT — PAIN SCALES - GENERAL: PAINLEVEL: NO PAIN (0)

## 2021-03-23 NOTE — RESULT ENCOUNTER NOTE
Dear Evan,    Here is a summary of your recent test results:  -Normal red blood cell (hgb) levels, normal white blood cell count and normal platelet levels.  -PSA (prostate specific antigen) test is normal.  This indicates a low likelihood of prostate cancer.  ADVISE: rechecking this in 1 year.  -LDL(bad) cholesterol level is elevated, and your triglycerides are elevated which can increase your heart disease risk.  A diet high in fat and simple carbohydrates, genetics and being overweight can contribute to this. ADVISE: exercising 150 minutes of aerobic exercise per week (30 minutes for 5 days per week or 50 minutes for 3 days per week are options), eating a low saturated fat/low carbohydrate diet, and omega-3 fatty acids (fish oil) 9037-7175 mg daily are helpful to improve this. Current guidelines from the American Heart Association support starting a cholesterol lowering medication to lower your heart and stroke disease risk.  I am sending a prescription to your pharmacy: rosuvastatin(Crestor) 20 mg each evening.  You can call your pharmacy to let them know you would like your prescription filled and if you have concerns about this recommendation then please contact me. In 3 months, you should recheck your fasting cholesterol panel by scheduling a lab-only appointment.   Also it would be reasonable to check a CT scan of your heart to find your calcium score (helps determines heart disease risk)  -Liver and gallbladder tests are normal (ALT,AST, Alk phos, bilirubin), kidney function is normal (Cr, GFR), sodium is normal, potassium is normal, calcium is normal, glucose is normal.  -CK (muscle enzyme test) is mildly elevated. ADVISE: Rechecking in 1 to 2 months after starting cholesterol medication or sooner if any muscle aches  -Vitamin D level is low and oral supplementation should be started.  ADVISE: starting over the counter Vitamin D3  2000 IU - 3 tabs (6000 IU) daily for 6 weeks and then 2000 IU daily to  maintain levels.  Then in 2 months, please schedule a lab only appointment to recheck your Vitamin D levels.    For additional lab test information, [email or web address] is an excellent reference.           Thank you very much for trusting me and [facility].     Have a peaceful day.    Healthy regards,  Omega Licona MD

## 2021-10-06 DIAGNOSIS — E78.5 HYPERLIPIDEMIA LDL GOAL <100: ICD-10-CM

## 2021-10-08 RX ORDER — ROSUVASTATIN CALCIUM 20 MG/1
20 TABLET, COATED ORAL DAILY
Qty: 90 TABLET | Refills: 3 | OUTPATIENT
Start: 2021-10-08

## 2021-10-09 ENCOUNTER — HEALTH MAINTENANCE LETTER (OUTPATIENT)
Age: 61
End: 2021-10-09

## 2022-05-21 ENCOUNTER — HEALTH MAINTENANCE LETTER (OUTPATIENT)
Age: 62
End: 2022-05-21

## 2022-09-17 ENCOUNTER — HEALTH MAINTENANCE LETTER (OUTPATIENT)
Age: 62
End: 2022-09-17

## 2023-06-04 ENCOUNTER — HEALTH MAINTENANCE LETTER (OUTPATIENT)
Age: 63
End: 2023-06-04

## 2024-02-24 ENCOUNTER — HEALTH MAINTENANCE LETTER (OUTPATIENT)
Age: 64
End: 2024-02-24